# Patient Record
Sex: MALE | Race: WHITE | NOT HISPANIC OR LATINO | Employment: PART TIME | URBAN - NONMETROPOLITAN AREA
[De-identification: names, ages, dates, MRNs, and addresses within clinical notes are randomized per-mention and may not be internally consistent; named-entity substitution may affect disease eponyms.]

---

## 2024-04-17 ENCOUNTER — HOSPITAL ENCOUNTER (EMERGENCY)
Facility: HOSPITAL | Age: 24
Discharge: HOME | End: 2024-04-17
Attending: STUDENT IN AN ORGANIZED HEALTH CARE EDUCATION/TRAINING PROGRAM
Payer: MEDICAID

## 2024-04-17 VITALS
OXYGEN SATURATION: 97 % | WEIGHT: 254.6 LBS | TEMPERATURE: 98.2 F | BODY MASS INDEX: 32.67 KG/M2 | HEIGHT: 74 IN | SYSTOLIC BLOOD PRESSURE: 122 MMHG | RESPIRATION RATE: 18 BRPM | HEART RATE: 74 BPM | DIASTOLIC BLOOD PRESSURE: 75 MMHG

## 2024-04-17 DIAGNOSIS — S09.90XA MILD CLOSED HEAD INJURY, INITIAL ENCOUNTER: Primary | ICD-10-CM

## 2024-04-17 PROCEDURE — 99282 EMERGENCY DEPT VISIT SF MDM: CPT

## 2024-04-17 NOTE — ED PROVIDER NOTES
EMERGENCY DEPARTMENT ENCOUNTER    Pt Name: Mir Silva  MRN: [id number]  Pt :   2000  Room Number:    Date of encounter:  2024  PCP: No primary care provider on file.  ED Provider: Saad Cortes MD    Historian: Patient      HPI:  Chief Complaint: Head injury        Context: Mir Silva is a 23 y.o. male who presents to the ED c/o head injury.  Patient states he was going to bed and accidentally hit his head on the underside of a shelf.  Denies loss of consciousness.  States he did not feel like he hit that hard.  Whenever he was touching his scalp he felt like there was a dent in it so  he came to emergency department for evaluation.      PAST MEDICAL HISTORY  History reviewed. No pertinent past medical history.      PAST SURGICAL HISTORY  History reviewed. No pertinent surgical history.      FAMILY HISTORY  History reviewed. No pertinent family history.      SOCIAL HISTORY  Social History     Tobacco Use    Smoking status: Never    Smokeless tobacco: Never   Substance and Sexual Activity    Alcohol use: Not Currently    Drug use: Never         ALLERGIES  Patient has no known allergies.        REVIEW OF SYSTEMS  Review of Systems     All systems reviewed and negative except for those discussed in HPI.       PHYSICAL EXAM    I have reviewed the triage vital signs and nursing notes.    ED Triage Vitals [24 0100]   Temp Heart Rate Resp BP SpO2   98.2 °F (36.8 °C) 74 18 140/81 98 %      Temp src Heart Rate Source Patient Position BP Location FiO2 (%)   Oral Monitor Sitting Left arm --       Physical Exam    General:  Awake, alert, no acute distress  HEENT: Atraumatic, normocephalic no significant palpable hematomas along with no visible bruising on top of scalp, can feel midline suture line on scalp but no other bony deformity, EOMI, PERRLA, mucous membranes moist.   NECK:  Supple, atraumatic  Cardiovascular:  Regular rate.  all extremities well perfused  Respiratory:  Regular rate, equal  chest rise. No resp distress  Abdominal:  Soft, nondistended   Extremity:  No visible bony abnormalities in all 4 extremities.  Full range of motion of all extremities.  Skin:  Warm and dry.  No rashes  Lymphatic:  No gross lymphadenopathy, no cervical lymphadenopathy  Neuro:  AAOx3, GCS 15.  moving all extremities  Psych:   Normal mood and affect        LAB RESULTS  No results found for this or any previous visit (from the past 24 hour(s)).        RADIOLOGY  No Radiology Exams Resulted Within Past 24 Hours      PROCEDURES    Procedures    No orders to display       MEDICATIONS GIVEN IN ER    Medications - No data to display      MEDICAL DECISION MAKING, PROGRESS, and CONSULTS    All labs, if obtained, have been independently reviewed by me.  All radiology studies, if obtained, have been reviewed by me and the radiologist dictating the report.  All EKG's, if obtained, have been independently viewed and interpreted by me.      Discussion below represents my analysis of pertinent findings related to patient's condition, differential diagnosis, treatment plan and final disposition.    Mir Silva is a 23 y.o. male who presents to the ED c/o head injury.  [place] head CT rule negative.  No palpable deformities on scalp other than a palpable suture line fusion.  No bruising or hematomas noted.  No indication for CT imaging.  Patient denying any significant headache or vision changes.  Patient agreeable with plan for discharge.                               Orders placed during this visit:  No orders of the defined types were placed in this encounter.        ED Course:    Consultants:                  Shared Decision Making:  After my consideration of clinical presentation and any laboratory/radiology studies obtained, I discussed the findings with the patient/patient representative who is in agreement with the treatment plan and the final disposition.   Risks and benefits of discharge and/or observation/admission were  discussed.      AS OF 01:41 EDT VITALS:    BP - 140/81  HR - 74  TEMP - 98.2 °F (36.8 °C) (Oral)  O2 SATS - 98%                  DIAGNOSIS  Final diagnoses:   Mild closed head injury, initial encounter         DISPOSITION  Discharge      Please note that portions of this document were completed with voice recognition software.        Saad Cortes MD  04/17/24 [id number]